# Patient Record
Sex: FEMALE | Race: WHITE | NOT HISPANIC OR LATINO | Employment: OTHER | URBAN - METROPOLITAN AREA
[De-identification: names, ages, dates, MRNs, and addresses within clinical notes are randomized per-mention and may not be internally consistent; named-entity substitution may affect disease eponyms.]

---

## 2023-06-28 PROBLEM — R53.83 FATIGUE: Status: ACTIVE | Noted: 2023-04-18

## 2023-06-28 PROBLEM — R20.2 PARESTHESIA OF LOWER EXTREMITY: Status: ACTIVE | Noted: 2023-04-18

## 2023-06-28 PROBLEM — G35 MS (MULTIPLE SCLEROSIS): Status: ACTIVE | Noted: 2023-04-18

## 2023-06-28 PROBLEM — E55.9 VITAMIN D DEFICIENCY: Status: ACTIVE | Noted: 2023-04-18

## 2023-06-28 PROBLEM — F17.200 NICOTINE DEPENDENCE: Status: ACTIVE | Noted: 2023-04-18

## 2023-06-28 PROBLEM — N39.0 RECURRENT URINARY TRACT INFECTION: Status: ACTIVE | Noted: 2023-04-18

## 2023-08-02 ENCOUNTER — HOSPITAL ENCOUNTER (OUTPATIENT)
Dept: MRI IMAGING | Facility: HOSPITAL | Age: 63
Discharge: HOME | End: 2023-08-02
Payer: MEDICARE

## 2023-08-02 DIAGNOSIS — G35 MULTIPLE SCLEROSIS: ICD-10-CM

## 2023-08-02 LAB
CREAT BLDA-MCNC: 2.1 MG/DL
EGFRCR SERPLBLD CKD-EPI 2021: 25.9 ML/MIN/1.73

## 2023-08-02 PROCEDURE — 72141 MRI NECK SPINE W/O DYE: CPT

## 2023-08-02 PROCEDURE — 82565 ASSAY OF CREATININE: CPT

## 2023-08-02 PROCEDURE — 70551 MRI BRAIN STEM W/O DYE: CPT

## 2023-08-02 PROCEDURE — 72146 MRI CHEST SPINE W/O DYE: CPT

## 2023-08-23 ENCOUNTER — TELEPHONE (OUTPATIENT)
Dept: NEUROLOGY | Facility: CLINIC | Age: 63
End: 2023-08-23

## 2023-08-23 NOTE — TELEPHONE ENCOUNTER
Caller: SALVATORE    Relationship: SELF    Best call back number: [phone]    Caller requesting test results: PT    What test was performed: MRI BRAIN, CSPINE, TSPINE    When was the test performed: 8-2-23    Where was the test performed: [facility]    PT STATES SHE NEEDS A FOLLOW UP , PLEASE ADVISE ON TIMEFRAME

## 2023-08-30 ENCOUNTER — OFFICE VISIT (OUTPATIENT)
Dept: NEUROLOGY | Facility: CLINIC | Age: 63
End: 2023-08-30
Payer: MEDICARE

## 2023-08-30 VITALS
BODY MASS INDEX: 30.85 KG/M2 | SYSTOLIC BLOOD PRESSURE: 123 MMHG | OXYGEN SATURATION: 97 % | HEIGHT: 69 IN | DIASTOLIC BLOOD PRESSURE: 54 MMHG | HEART RATE: 84 BPM

## 2023-08-30 DIAGNOSIS — G35 MULTIPLE SCLEROSIS: ICD-10-CM

## 2023-08-30 DIAGNOSIS — I69.30 SEQUELAE, POST-STROKE: Primary | ICD-10-CM

## 2023-08-30 DIAGNOSIS — I63.89 OTHER CEREBRAL INFARCTION: ICD-10-CM

## 2023-08-30 RX ORDER — ATORVASTATIN CALCIUM 40 MG/1
40 TABLET, FILM COATED ORAL NIGHTLY
COMMUNITY
Start: 2023-08-18

## 2023-08-30 NOTE — PROGRESS NOTES
This is a very pleasant 62-year-old female with longstanding multiple sclerosis not currently on disease modifying therapy.  She also has end-stage renal disease.  She is here today to review imaging.  She reports no new neurologic issues since I saw her last.        I reviewed the MRI of the brain and spinal cord which was consistent with multiple sclerosis she has multiple lesions in the brain in a classic pattern for multiple sclerosis.  There is a cystic lesion in the zuri that is either an old infarct or possibly an old demyelinating lesion although it looks more like an old infarct to me.  She had a couple small lesions in the cervical cord I did not see any obvious lesions in the thoracic cord.  I do not see any obvious active lesions all these scans were done without contrast.  I do not have any previous imaging to compare these to.  She has some mild degenerative disease.        In summary this very pleasant 62-year-old female with longstanding multiple sclerosis.  Given her age I do not think she needs to be on disease modifying therapy at this point.  She may have had a small lacunar infarct in the zuri although this could be an old demyelinating lesion.  She is already on cholesterol medicine I recommend that she was started baby aspirin as long as her nephrologist is okay with this.  For completeness sake I will obtain a carotid duplex and echo.  She will follow-up in 6 months or sooner if there is any problems in the interim.        I spent 30 minutes in patient care.

## 2023-10-02 DIAGNOSIS — G35 MULTIPLE SCLEROSIS: ICD-10-CM

## 2023-10-03 ENCOUNTER — OFFICE VISIT (OUTPATIENT)
Dept: NEUROLOGY | Facility: CLINIC | Age: 63
End: 2023-10-03
Payer: MEDICARE

## 2023-10-03 VITALS
HEIGHT: 69 IN | RESPIRATION RATE: 18 BRPM | HEART RATE: 99 BPM | SYSTOLIC BLOOD PRESSURE: 146 MMHG | BODY MASS INDEX: 30.27 KG/M2 | OXYGEN SATURATION: 96 % | WEIGHT: 204.4 LBS | DIASTOLIC BLOOD PRESSURE: 73 MMHG

## 2023-10-03 DIAGNOSIS — G35 MULTIPLE SCLEROSIS: Primary | ICD-10-CM

## 2023-10-03 PROCEDURE — 99214 OFFICE O/P EST MOD 30 MIN: CPT | Performed by: PSYCHIATRY & NEUROLOGY

## 2023-10-03 RX ORDER — HYDROCODONE BITARTRATE AND ACETAMINOPHEN 5; 325 MG/1; MG/1
TABLET ORAL
COMMUNITY
Start: 2023-09-22

## 2023-10-03 RX ORDER — GABAPENTIN 300 MG/1
300 CAPSULE ORAL 4 TIMES DAILY
Qty: 120 CAPSULE | Refills: 0 | Status: SHIPPED | OUTPATIENT
Start: 2023-10-03

## 2023-10-03 RX ORDER — OXYBUTYNIN CHLORIDE 5 MG/1
TABLET ORAL
COMMUNITY
Start: 2023-09-22

## 2023-10-03 RX ORDER — SULFAMETHOXAZOLE AND TRIMETHOPRIM 400; 80 MG/1; MG/1
TABLET ORAL
COMMUNITY

## 2023-10-03 RX ORDER — CIPROFLOXACIN 500 MG/1
1 TABLET, FILM COATED ORAL EVERY 12 HOURS SCHEDULED
COMMUNITY
Start: 2023-09-15

## 2023-10-03 RX ORDER — TAMSULOSIN HYDROCHLORIDE 0.4 MG/1
1 CAPSULE ORAL DAILY
COMMUNITY

## 2023-10-03 NOTE — PROGRESS NOTES
This a very pleasant 62-year-old female with longstanding multiple sclerosis not currently on disease modifying therapy.  She also has end-stage renal disease.  She is currently on aspirin and cholesterol medication for possible lacunar infarct.        She reports no new neurologic issues since I saw her last.  Overall she states that she feels good in regards to her multiple sclerosis.  She is still struggling with her overall renal function but she has not started dialysis.        On examination today she is alert and oriented x3.  Her speech is fluent there is no dysarthria no aphasia.  She has a somewhat flattened affect.  Cranial nerves II through XII are intact she had 5 out of 5 strength in bilateral upper and lower extremities with normal tone and bulk there is no ataxia finger-nose or heel-to-shin she was able to rise from a chair and walk 25 feet and 5 seconds.        I reviewed her carotid duplex which shows no major vascular occlusion.  Her echocardiogram showed no thrombus.    Overall her multiple sclerosis is stable.  I think given her age she can stay off disease modifying therapy for now.  I would recommend follow-up in 1 year with repeat imaging at this time.      I explained to her that I am changing my practice later this year.  I am going to refer her to one of the other [facility] neurologist.  I would recommend she see Dr. Mccullough in [place] who specializes in multiple sclerosis although if she prefer to see one of the local [facility] neurologists that is fine.  I reiterated the importance of close neurologic follow-up given her condition.        Of course if she has any problems in the interim before I transition I be happy to see her again.        I spent 30 minutes in patient care.

## 2023-11-02 DIAGNOSIS — G35 MULTIPLE SCLEROSIS: ICD-10-CM

## 2023-11-02 NOTE — TELEPHONE ENCOUNTER
LMTRC, pt will need to reach out to PCP to see if she will take over filling medication, at least until pt gets in with new neurologist. Dr. Hamilton no longer a provider within [facility]

## 2023-11-07 RX ORDER — GABAPENTIN 300 MG/1
300 CAPSULE ORAL 4 TIMES DAILY
Qty: 120 CAPSULE | Refills: 0 | OUTPATIENT
Start: 2023-11-07

## 2023-11-07 NOTE — TELEPHONE ENCOUNTER
Attempted to reach out to pt to inform her that Dr. Hamilton no longer apart of [facility]. No call back. Medication being denied. Will have to attempt to request refill through PCP. Letters were mailed to pts regarding Dr. Lee departure -pp